# Patient Record
Sex: MALE | Race: WHITE
[De-identification: names, ages, dates, MRNs, and addresses within clinical notes are randomized per-mention and may not be internally consistent; named-entity substitution may affect disease eponyms.]

---

## 2018-12-23 ENCOUNTER — HOSPITAL ENCOUNTER (EMERGENCY)
Dept: HOSPITAL 44 - ED | Age: 38
Discharge: HOME | End: 2018-12-23
Payer: SELF-PAY

## 2018-12-23 VITALS — DIASTOLIC BLOOD PRESSURE: 78 MMHG | SYSTOLIC BLOOD PRESSURE: 121 MMHG

## 2018-12-23 DIAGNOSIS — G56.03: Primary | ICD-10-CM

## 2018-12-23 PROCEDURE — 99281 EMR DPT VST MAYX REQ PHY/QHP: CPT

## 2018-12-23 PROCEDURE — 99282 EMERGENCY DEPT VISIT SF MDM: CPT

## 2018-12-23 NOTE — ED PHYSICIAN DOCUMENTATION
General Adult





- HISTORIAN


Historian: patient





- HPI


Stated Complaint: bilateral hand pain 


Chief Complaint: Upper Extremity Problem


Onset: other (chronic carpel tunnel)


Timing: still present


Severity: mild


Further Comments: yes (He states he is awaiting surgery in Jan for carpal tunnel

release. He states he pain has been increaed "for a while now" He states he 

still has full use and ROM of the hand. When pain is increased he does note some

tingling in his fingers. He is wearing braces)





- ROS


CONST: no problems





- PAST HX


Past History: other (he is not willing to discuss this )


Immunizations: other (did not want to discuss)


Allergies/Adverse Reactions: 


                                    Allergies











Allergy/AdvReac Type Severity Reaction Status Date / Time


 


amoxicillin [From Amoxil] Allergy   Verified 12/23/18 13:45


 


tramadol [From Ultram] Allergy   Verified 12/23/18 13:45














Home Medications: 


                                Ambulatory Orders











 Medication  Instructions  Recorded


 


Gabapentin [Neurontin] 1,200 mg PO TID 12/23/18














- SOCIAL HX


Smoking History: cigarettes


Alcohol Use: none


Drug Use: none





- FAMILY HX


Family History: No (he does not want to comment on family history )





- REVIEWED ASSESSMENTS


Nursing Assessment  Reviewed: Yes


Vitals Reviewed: Yes





Progress





- Progress


Progress: 





1345: refuses Injectable steroid DG 


1350: he did ask at discharge where the next closest ER is 





General Adult Physical Exam





- PHYSICAL EXAM


GENERAL APPEARANCE: no distress


EENT: eye inspection normal


NECK: normal inspection


RESPIRATORY: no resp distress


CVS: reg rate & rhythm, heart sounds normal


ABDOMEN: soft


BACK: normal inspection


SKIN: warm/dry, normal color


EXTREMITIES: non-tender, normal range of motion, no evidence of injury, other 

(both hands with FROM. no increased swelling. Pulses + and cap refill + )


NEURO: oriented X3





Discharge


Clincal Impression: 


 Carpal tunnel syndrome on both sides





Referrals: 


Primary Doctor,No [Primary Care Provider] - 2 Days


Comments: 





1. Continue meds 


2. Declined injection for steroid - take medrol dose pack as directed


3. Ice packs to wrists


4. Keep braces on 


5. Keep in contact with surgeon that the pain is increasing 


6. Return to ER for any concerns 


Condition: Stable


Disposition: 01 HOME, SELF-CARE


Decision to Admit: NO


Date of Decison to Admit: 12/23/18


Decision Time: 13:52